# Patient Record
Sex: MALE | Race: WHITE
[De-identification: names, ages, dates, MRNs, and addresses within clinical notes are randomized per-mention and may not be internally consistent; named-entity substitution may affect disease eponyms.]

---

## 2017-06-03 ENCOUNTER — HOSPITAL ENCOUNTER (EMERGENCY)
Dept: HOSPITAL 41 - JD.ED | Age: 37
Discharge: HOME | End: 2017-06-03
Payer: SELF-PAY

## 2017-06-03 VITALS — DIASTOLIC BLOOD PRESSURE: 80 MMHG | SYSTOLIC BLOOD PRESSURE: 116 MMHG

## 2017-06-03 DIAGNOSIS — Z79.899: ICD-10-CM

## 2017-06-03 DIAGNOSIS — F41.9: Primary | ICD-10-CM

## 2017-06-03 DIAGNOSIS — Z86.2: ICD-10-CM

## 2017-06-03 PROCEDURE — 84484 ASSAY OF TROPONIN QUANT: CPT

## 2017-06-03 PROCEDURE — 85025 COMPLETE CBC W/AUTO DIFF WBC: CPT

## 2017-06-03 PROCEDURE — 93005 ELECTROCARDIOGRAM TRACING: CPT

## 2017-06-03 PROCEDURE — 80053 COMPREHEN METABOLIC PANEL: CPT

## 2017-06-03 PROCEDURE — 71020: CPT

## 2017-06-03 PROCEDURE — 84443 ASSAY THYROID STIM HORMONE: CPT

## 2017-06-03 PROCEDURE — 99285 EMERGENCY DEPT VISIT HI MDM: CPT

## 2017-06-03 PROCEDURE — 36415 COLL VENOUS BLD VENIPUNCTURE: CPT

## 2017-06-03 NOTE — EDM.PDOC
ED HPI GENERAL MEDICAL PROBLEM





- General


Chief Complaint: Respiratory Problem


Stated Complaint: SOB


Time Seen by Provider: 06/03/17 13:06


Source of Information: Reports: Patient


History Limitations: Reports: No Limitations





- History of Present Illness


INITIAL COMMENTS - FREE TEXT/NARRATIVE: 


Patient is a 37-year-old male who presents to the E.D complaining of 

progressive shortness of breath and tingling feeling in his chest. States it 

has been going on for the past 2 days. Notes he gets worse with stressing out. 

Has also experienced some numbness into into his fingers and toes with 

breathing faster. States he has been under a lot more stress recently and has 

been having minimal sleep due to work. Note some excessive yawning and taking a 

lot of deep breaths. He is mildly short of breath with exertion. There's been 

no cough, fever, chest pain, nausea/vomiting, dizziness, pre-syncope/syncopal 

episode or any additional complaints. He has no history of DVT/PE.  He has been 

utilizing albuterol inhaler that was prescribed by a provider at the urgent 

care clinic in [facility]. He's had no relief with the albuterol.





Past medical history vitamin B12 and T3 deficiency





Current medications albuterol inhaler, Centrum, B12, and the 3





Patient does not smoke or utilize alcohol occasionally denies recreational drug 

use.


Onset Date: 06/01/17


Duration: Intermittent


Location: Reports: Chest


Associated Symptoms: Reports: Shortness of Breath.  Denies: Cough, Diaphoresis, 

Fever/Chills, Nausea/Vomiting, Syncope, Weakness


Treatments PTA: Reports: Other (see below) (see hpi)


  ** Chest


Pain Score (Numeric/FACES): 5





- Related Data


 Allergies











Allergy/AdvReac Type Severity Reaction Status Date / Time


 


No Known Allergies Allergy   Verified 06/03/17 13:01











Home Meds: 


 Home Meds





Cyanocobalamin (Vitamin B12) [Vitamin B12] 1 unit PO DAILY 06/03/17 [History]


Ergocalciferol (Vitamin D2) [Vitamin D] 1 unit PO DAILY 06/03/17 [History]











Past Medical History


Gastrointestinal History: Reports: Pancreatitis


Hematologic History: Reports: Anemia, B12 Deficiency





Social & Family History





- Tobacco Use


Smoking Status *Q: Never Smoker





- Caffeine Use


Caffeine Use: Reports: None





- Recreational Drug Use


Recreational Drug Use: No





ED ROS GENERAL





- Review of Systems


Review Of Systems: See Below


Constitutional: Reports: No Symptoms


HEENT: Reports: Sinus Problem.  Denies: Ear Pain, Throat Pain, Vertigo, Vision 

Change


Respiratory: Reports: Shortness of Breath.  Denies: Cough, Sputum


Cardiovascular: Reports: Palpitations (occassionally at night).  Denies: Chest 

Pain, Dyspnea on Exertion, PND, Syncope


GI/Abdominal: Denies: Abdominal Pain, Constipation, Diarrhea, Nausea, Vomiting


: Reports: No Symptoms


Musculoskeletal: Reports: No Symptoms


Neurological: Denies: Dizziness





ED EXAM, GENERAL





- Physical Exam


Exam: See Below


Exam Limited By: No Limitations


General Appearance: Alert, WD/WN, No Apparent Distress


Eye Exam: Bilateral Eye: EOMI, PERRL


Ears: Hearing Grossly Normal


Nose: Normal Inspection


Throat/Mouth: Normal Voice, No Airway Compromise


Neck: Normal Inspection, Supple, Non-Tender.  No: Lymphadenopathy (L), 

Lymphadenopathy (R)


Respiratory/Chest: No Respiratory Distress, Lungs Clear, Normal Breath Sounds, 

No Accessory Muscle Use, Chest Non-Tender


Cardiovascular: Normal Peripheral Pulses, Regular Rate, Rhythm, No Murmur


Peripheral Pulses: 2+: Radial (L), Radial (R)


GI/Abdominal: Normal Bowel Sounds, Soft, Non-Tender, No Organomegaly, No 

Distention, No Mass


Back Exam: Normal Inspection


Extremities: Normal Inspection, Non-Tender, No Pedal Edema, Normal Capillary 

Refill


Neurological: Alert, Oriented, CN II-XII Intact, Normal Cognition, No Motor/

Sensory Deficits


Psychiatric: Normal Affect, Normal Mood


Skin Exam: Warm, Dry, Intact, Normal Color, No Rash





Course





- Vital Signs


Last Recorded V/S: 


 Last Vital Signs











Temp  98.3 F   06/03/17 12:57


 


Pulse  87   06/03/17 12:57


 


Resp  18   06/03/17 12:57


 


BP  138/86   06/03/17 12:57


 


Pulse Ox  98   06/03/17 12:57














- Orders/Labs/Meds


Orders: 


 Active Orders 24 hr











 Category Date Time Status


 


 EKG Documentation Completion [RC] STAT Care  06/03/17 13:26 Active


 


 Chest 2V [CR] Stat Exams  06/03/17 13:25 Taken











Labs: 


 Laboratory Tests











  06/03/17 06/03/17 Range/Units





  13:36 13:36 


 


WBC  5.29   (4.23-9.07)  K/mm3


 


RBC  4.60 L   (4.63-6.08)  M/mm3


 


Hgb  14.4   (13.7-17.5)  gm/L


 


Hct  40.2   (40.1-51.0)  %


 


MCV  87.4   (79.0-92.2)  fl


 


MCH  31.3   (25.7-32.2)  pg


 


MCHC  35.8 H   (32.2-35.5)  g/dl


 


RDW Std Deviation  37.2   (35.1-43.9)  fL


 


Plt Count  275   (163-337)  K/mm3


 


MPV  8.5 L   (9.4-12.3)  fl


 


Neut % (Auto)  62.5   (34.0-67.9)  %


 


Lymph % (Auto)  27.6   (21.8-53.1)  %


 


Mono % (Auto)  9.1   (5.3-12.2)  %


 


Eos % (Auto)  0.4 L   (0.8-7.0)  


 


Baso % (Auto)  0.4   (0.1-1.2)  %


 


Neut # (Auto)  3.31   (1.78-5.38)  K/mm3


 


Lymph # (Auto)  1.46   (1.32-3.57)  K/mm3


 


Mono # (Auto)  0.48   (0.30-0.82)  K/mm3


 


Eos # (Auto)  0.02 L   (0.04-0.54)  K/mm3


 


Baso # (Auto)  0.02   (0.01-0.08)  K/mm3


 


Sodium   138  (136-145)  mEq/L


 


Potassium   4.0  (3.5-5.1)  mEq/L


 


Chloride   103  ()  mEq/L


 


Carbon Dioxide   26  (21-32)  mEq/L


 


Anion Gap   13.0  (5-15)  


 


BUN   14  (7-18)  mg/dL


 


Creatinine   1.1  (0.7-1.3)  mg/dL


 


Est Cr Clr Drug Dosing   95.86  mL/min


 


Estimated GFR (MDRD)   > 60  (>60)  mL/min


 


BUN/Creatinine Ratio   12.7 L  (14-18)  


 


Glucose   78  ()  mg/dL


 


Calcium   8.7  (8.5-10.1)  mg/dL


 


Total Bilirubin   0.4  (0.2-1.0)  mg/dL


 


AST   24  (15-37)  U/L


 


ALT   39  (16-63)  U/L


 


Alkaline Phosphatase   87  ()  U/L


 


Troponin I   < 0.017  (0.00-0.056)  ng/mL


 


Total Protein   7.5  (6.4-8.2)  g/dl


 


Albumin   4.1  (3.4-5.0)  g/dl


 


Globulin   3.4  gm/dL


 


Albumin/Globulin Ratio   1.2  (1-2)  


 


TSH 3rd Generation   0.952  (0.358-3.74)  uIU/mL











Meds: 


Medications














Discontinued Medications














Generic Name Dose Route Start Last Admin





  Trade Name Freq  PRN Reason Stop Dose Admin


 


Lorazepam  1 mg  06/03/17 13:26  06/03/17 13:37





  Ativan  PO  06/03/17 13:27  1 mg





  ONETIME ONE   Administration














- Re-Assessments/Exams


Free Text/Narrative Re-Assessment/Exam: 





06/03/17 13:38 Ordered Ativan 1 mg p.o., CBC, chem 14, troponin, TSH, chest x-

ray, and EKG.





EKG sinus rhythm at a rate of 69 with normal P axis. , VT interval is 

110. No acute ST changes noted.





Chest x-ray did not elicit any acute findings. Final interpretation is pending. 

Dr. Lawson reviewed x-ray. 





Labs reviewed did not reveal any concerning findings.





1437 Reassessment, symptoms have improved with the Ativan therapy. Suggesting 

this is associated with anxiety/stress.  Patient is ready to be discharged 

home. We'll discharge patient home with instructions as documented











Departure





- Departure


Time of Disposition: 14:58


Disposition: Home, Self-Care 01


Condition: good


Clinical Impression: 


Anxiety disorder, unspecified


Qualifiers:


 Anxiety disorder type: unspecified anxiety disorder Qualified Code(s): F41.9 - 

Anxiety disorder, unspecified








- Discharge Information


Referrals: 


Rosibel Cox PA [Physician Assistant] - 


Forms:  ED Department Discharge, Return to Work/School Form


Additional Instructions: 


As discussed labs and CXR did not elicit any concerning findings.  Symptoms 

improved after being administered ativan. Suggesting this was associated with 

anxiety brought on by stress.  Thus will have you followup with a PCP this 

coming week at [facility] for reevaluation and treatment.  No 

driving today since receiving sedative medication. Get adequate sleep. Find 

time to do things you enjoy other then work.  Stay away from alcohol use.  

Return to the E.D. for any new or worsening symptoms. 





- My Orders


Last 24 Hours: 


My Active Orders





06/03/17 13:25


Chest 2V [CR] Stat 





06/03/17 13:26


EKG Documentation Completion [RC] STAT 














- Assessment/Plan


Last 24 Hours: 


My Active Orders





06/03/17 13:25


Chest 2V [CR] Stat 





06/03/17 13:26


EKG Documentation Completion [RC] STAT

## 2017-06-05 NOTE — CR
Chest: Two views of the chest were obtained.

 

Comparison: No previous chest x-ray.

 

Heart size and mediastinum are normal.  Lungs are clear.  Bony 

structures are unremarkable for the patient's age.

 

Impression:

1.  Nothing acute is identified on two-view chest x-ray.

 

Diagnostic code #1

## 2019-06-12 ENCOUNTER — HOSPITAL ENCOUNTER (EMERGENCY)
Dept: HOSPITAL 56 - MW.ED | Age: 39
Discharge: HOME | End: 2019-06-12
Payer: SELF-PAY

## 2019-06-12 VITALS — DIASTOLIC BLOOD PRESSURE: 76 MMHG | SYSTOLIC BLOOD PRESSURE: 129 MMHG

## 2019-06-12 DIAGNOSIS — Z79.899: ICD-10-CM

## 2019-06-12 DIAGNOSIS — K21.9: ICD-10-CM

## 2019-06-12 DIAGNOSIS — E78.00: ICD-10-CM

## 2019-06-12 DIAGNOSIS — K62.89: Primary | ICD-10-CM

## 2019-06-12 LAB
CHLORIDE SERPL-SCNC: 100 MMOL/L (ref 98–107)
SODIUM SERPL-SCNC: 135 MMOL/L (ref 136–148)

## 2019-06-12 PROCEDURE — 36415 COLL VENOUS BLD VENIPUNCTURE: CPT

## 2019-06-12 PROCEDURE — 80053 COMPREHEN METABOLIC PANEL: CPT

## 2019-06-12 PROCEDURE — 81003 URINALYSIS AUTO W/O SCOPE: CPT

## 2019-06-12 PROCEDURE — 74177 CT ABD & PELVIS W/CONTRAST: CPT

## 2019-06-12 PROCEDURE — 96360 HYDRATION IV INFUSION INIT: CPT

## 2019-06-12 PROCEDURE — 85025 COMPLETE CBC W/AUTO DIFF WBC: CPT

## 2019-06-12 PROCEDURE — 93005 ELECTROCARDIOGRAM TRACING: CPT

## 2019-06-12 PROCEDURE — 83690 ASSAY OF LIPASE: CPT

## 2019-06-12 PROCEDURE — 99284 EMERGENCY DEPT VISIT MOD MDM: CPT

## 2019-06-12 NOTE — CT
INDICATION: Right lower quadrant pain with bloody stool



TECHNIQUE: CT Abdomen and pelvis with i.v. contrast. Coronal and sagittal 

reformats were obtained.



CONTRAST: 100 mL Isovue 370



COMPARISON: None



FINDINGS: 



Lower chest: Unremarkable. 



Liver: Unremarkable. 



Spleen: Unremarkable. 



Pancreas: Unremarkable. 



Gallbladder: Unremarkable. 



Kidney: There is a 2.4 cm cyst present in the upper pole of the left 

kidney. 



Adrenal: Unremarkable. 



Bowel: Mild wall thickening of the rectum is present. The appendix is 

normal in appearance and size. 



Vascular: Unremarkable. 



Lymph: Unremarkable. 



Peritoneum: Unremarkable. No pneumoperitoneum is seen. No significant 

ascites is noted. 



Pelvis: The bladder is decompressed and difficult to evaluate due to 

suspect an abnormal thickening. 



Soft tissue: Unremarkable. 



Bone: Unremarkable for age. 



IMPRESSION: 



1. Mild wall thickening of the rectum is present. Proctitis is suspected 

and followup imaging is recommended to document resolution.



2. The bladder is decompressed and difficult to evaluate due to suspect an 

abnormal thickening. Correlation with urinalysis is recommended to exclude 

cystitis or urinary tract infection. 



Dictated by Chance Rodríguez MD @ 6/12/2019 6:14:28 PM



Please note that all CT scans at this facility use dose modulation, 

iterative reconstruction, and/or weight-based dosing when appropriate to 

reduce radiation dose to as low as reasonably achievable.



Dictated by: Chance Rodríguez MD @ 06/12/2019 18:17:02



(Electronically Signed)

## 2019-06-12 NOTE — EDM.PDOC
ED HPI GENERAL MEDICAL PROBLEM





- General


Chief Complaint: Abdominal Pain


Stated Complaint: ABDOMINAL PAIN


Time Seen by Provider: 06/12/19 16:27


Source of Information: Reports: Patient


History Limitations: Reports: No Limitations





- History of Present Illness


INITIAL COMMENTS - FREE TEXT/NARRATIVE: 


HISTORY AND PHYSICAL:





History of present illness:


Patient is a 39-year-old male presents to the ED today with concern of right 

lower quadrant pain over the past 2 days. Patient states he did have a one 

episode of bright red blood bowel movement on Sunday. Patient denies any blood 

in the stool since then. Patient states his bowel movements have been normal 

for him and then other than the right lower quadrant pain he hasn't had any 

other symptoms. He states that the pain comes and goes and currently rates it a 

5 out of 10. Patient denies any prior abdominal surgeries. Patient denies any 

health history.





Patient denies fever, chills, chest pain, shortness of breath, or cough. Denies 

headache, neck stiff ness, change in vision, syncope, or near syncope. Denies 

nausea, vomiting, diarrhea, constipation, or dysuria. Has not noted any blood 

in urine or stool. Patient has been eating and drinking appropriately.





Review of systems: 


As per history of present illness and below otherwise all systems reviewed and 

negative.





Past medical history: 


As per history of present illness and as reviewed below otherwise 

noncontributory.





Surgical history: 


As per history of present illness and as reviewed below otherwise 

noncontributory.





Social history: 


See social history for further information





Family history: 


As per history of present illness and as reviewed below otherwise 

noncontributory.





Physical exam:


General: Patient is alert, oriented, and in no acute distress. Patient sitting 

comfortably on exam table.


HEENT: Atraumatic, normocephalic, pupils equal and reactive bilaterally, 

negative for conjunctival pallor or scleral icterus, mucous membranes moist, 

TMs normal bilaterally, throat clear, neck supple, nontender, trachea midline. 

No drooling or trismus noted. No meningeal signs. No hot potato voice noted. 


Lungs: Clear to auscultation, breath sounds equal bilaterally, chest nontender.


Heart: S1S2, regular rate and rhythm without overt murmur


Abdomen: Soft, nondistended. Mild pain to palpation of the right lower quadrant 

without guarding. Negative for masses or hepatosplenomegaly. Negative for 

costovertebral tenderness.


Pelvis: Stable nontender.


Genitourinary: Deferred.


Rectal: Deferred.


Skin: Intact, warm, dry. No lesions or rashes noted.


Extremities: Atraumatic, negative for cords or calf pain. Neurovascular 

unremarkable.


Neuro: Awake, alert, oriented. Cranial nerves II through XII unremarkable. 

Cerebellum unremarkable. Motor and sensory unremarkable throughout. Exam 

nonfocal.





Notes:


Dr. Boss verbally involved in patient care.


Patient denies anal intercourse / penetration. Discussed cyst of kidney and 

follow up with primary care,


Consult to Dr. Garcia. Per Dr. Garcia, start patient on Flagyl, obtain stool 

cultures outpatient and have results sent to her for follow up with her in 2 

weeks. Patient provided with stool collection supplies.


Discussed this with patient. Voices understanding and is agreeable to plan of 

care. Denies any further questions or concerns at this time.





Diagnostics:


CBC, CMP, UA, EKG, lipase, abdominal pelvic CT, Hemoccult





Therapeutics:


Normal saline, Zofran





Prescription:


Flagyl, Stool Studies script and supplies





Impression: 


Rectum thickening, probable proctitis





Plan:


1. Take medication as prescribed. Stool study collection supplies have been 

provided to you, return these to the outpatient lab after sample.


2. Follow-up with general surgery, Dr. Garcia, as discussed. Return to the ED 

as needed and as discussed.


3. Tylenol as directed for pain and discomfort.








Definitive disposition and diagnosis as appropriate pending reevaluation and 

review of above.





  ** lower abdomen


Pain Score (Numeric/FACES): 6





- Related Data


 Allergies











Allergy/AdvReac Type Severity Reaction Status Date / Time


 


No Known Allergies Allergy   Verified 06/12/19 16:34











Home Meds: 


 Home Meds





Pantoprazole [ProTONIX***] 40 mg PO DAILY 06/12/19 [History]


atorvaSTATin [Lipitor] 20 mg PO DAILY 06/12/19 [History]











Past Medical History


Cardiovascular History: Reports: High Cholesterol


Gastrointestinal History: Reports: GERD, Pancreatitis


Hematologic History: Reports: Anemia





Social & Family History





- Family History


Family Medical History: Noncontributory





- Tobacco Use


Smoking Status *Q: Never Smoker





- Caffeine Use


Caffeine Use: Reports: None





- Recreational Drug Use


Recreational Drug Use: No





ED ROS GENERAL





- Review of Systems


Review Of Systems: ROS reveals no pertinent complaints other than HPI.





ED EXAM, GI/ABD





- Physical Exam


Exam: See Below (See dictation)





Course





- Vital Signs


Last Recorded V/S: 


 Last Vital Signs











Temp  36.1 C   06/12/19 16:32


 


Pulse  87   06/12/19 18:05


 


Resp  18   06/12/19 16:32


 


BP  122/73   06/12/19 18:05


 


Pulse Ox  98   06/12/19 18:05














- Orders/Labs/Meds


Orders: 


 Active Orders 24 hr











 Category Date Time Status


 


 EKG Documentation Completion [RC] STAT Care  06/12/19 16:54 Active


 


 Hemoccult [OCCULT BLOOD DIAGNOSTIC] [OP] Stat Lab  06/12/19 16:54 Ordered











Labs: 


 Laboratory Tests











  06/12/19 06/12/19 06/12/19 Range/Units





  16:57 16:57 17:06 


 


WBC  5.02    (4.0-11.0)  K/uL


 


RBC  4.96    (4.50-5.90)  M/uL


 


Hgb  15.5    (13.0-17.0)  g/dL


 


Hct  44.0    (38.0-50.0)  %


 


MCV  88.7    (80.0-98.0)  fL


 


MCH  31.3    (27.0-32.0)  pg


 


MCHC  35.2    (31.0-37.0)  g/dL


 


RDW Std Deviation  38.5    (28.0-62.0)  fl


 


RDW Coeff of Kimberly  12    (11.0-15.0)  %


 


Plt Count  268    (150-400)  K/uL


 


MPV  8.90    (7.40-12.00)  fL


 


Neut % (Auto)  51.5    (48.0-80.0)  %


 


Lymph % (Auto)  35.3    (16.0-40.0)  %


 


Mono % (Auto)  11.6    (0.0-15.0)  %


 


Eos % (Auto)  1.0    (0.0-7.0)  %


 


Baso % (Auto)  0.6    (0.0-1.5)  %


 


Neut # (Auto)  2.6    (1.4-5.7)  K/uL


 


Lymph # (Auto)  1.8    (0.6-2.4)  K/uL


 


Mono # (Auto)  0.6    (0.0-0.8)  K/uL


 


Eos # (Auto)  0.1    (0.0-0.7)  K/uL


 


Baso # (Auto)  0.0    (0.0-0.1)  K/uL


 


Nucleated RBC %  0.0    /100WBC


 


Nucleated RBCs #  0    K/uL


 


Sodium   135 L   (136-148)  mmol/L


 


Potassium   4.2   (3.5-5.1)  mmol/L


 


Chloride   100   ()  mmol/L


 


Carbon Dioxide   25.3   (21.0-32.0)  mmol/L


 


BUN   13   (7.0-18.0)  mg/dL


 


Creatinine   1.1   (0.8-1.3)  mg/dL


 


Est Cr Clr Drug Dosing   94.02   mL/min


 


Estimated GFR (MDRD)   > 60.0   ml/min


 


Glucose   91   ()  mg/dL


 


Calcium   9.3   (8.5-10.1)  mg/dL


 


Total Bilirubin   0.8   (0.2-1.0)  mg/dL


 


AST   35   (15-37)  IU/L


 


ALT   77 H   (14-63)  IU/L


 


Alkaline Phosphatase   100   ()  U/L


 


Total Protein   8.2   (6.4-8.2)  g/dL


 


Albumin   4.6   (3.4-5.0)  g/dL


 


Globulin   3.6   (2.6-4.0)  g/dL


 


Albumin/Globulin Ratio   1.3   (0.9-1.6)  


 


Lipase   121   ()  U/L


 


Urine Color    YELLOW  


 


Urine Appearance    CLEAR  


 


Urine pH    6.0  (5.0-8.0)  


 


Ur Specific Gravity    >= 1.030  (1.001-1.035)  


 


Urine Protein    NEGATIVE  (NEGATIVE)  mg/dL


 


Urine Glucose (UA)    NEGATIVE  (NEGATIVE)  mg/dL


 


Urine Ketones    NEGATIVE  (NEGATIVE)  mg/dL


 


Urine Occult Blood    NEGATIVE  (NEGATIVE)  


 


Urine Nitrite    NEGATIVE  (NEGATIVE)  


 


Urine Bilirubin    NEGATIVE  (NEGATIVE)  


 


Urine Urobilinogen    0.2  (<2.0)  EU/dL


 


Ur Leukocyte Esterase    NEGATIVE  (NEGATIVE)  











Meds: 


Medications














Discontinued Medications














Generic Name Dose Route Start Last Admin





  Trade Name Freq  PRN Reason Stop Dose Admin


 


Sodium Chloride  1,000 mls @ 999 mls/hr  06/12/19 16:53  06/12/19 17:04





  Normal Saline  IV  06/12/19 17:53  999 mls/hr





  BOLUS ONE   Administration





     





     





     





     


 


Iopamidol  100 ml  06/12/19 17:50  06/12/19 17:50





  Isovue Multipack-370 (76%)  IVPUSH  06/12/19 17:51  100 ml





  ONETIME STA   Administration





     





     





     





     


 


Ondansetron HCl  4 mg  06/12/19 16:47  06/12/19 17:05





  Zofran  IVPUSH  06/12/19 16:48  Not Given





  ONETIME ONE   





     





     





     





     














Departure





- Departure


Time of Disposition: 19:11


Disposition: Home, Self-Care 01


Clinical Impression: 


 Proctitis








- Discharge Information


Instructions:  Proctitis


Referrals: 


PCP,None [Primary Care Provider] - 


Forms:  ED Department Discharge


Additional Instructions: 


The following information is given to patients seen in the emergency department 

who are being discharged to home. This information is to outline your options 

for follow-up care. We provide all patients seen in our emergency department 

with a follow-up referral.





The need for follow-up, as well as the timing and circumstances, are variable 

depending upon the specifics of your emergency department visit.





If you don't have a primary care physician on staff, we will provide you with a 

referral. We always advise you to contact your personal physician following an 

emergency department visit to inform them of the circumstance of the visit and 

for follow-up with them and/or the need for any referrals to a consulting 

specialist.





The emergency department will also refer you to a specialist when appropriate. 

This referral assures that you have the opportunity for follow-up care with a 

specialist. All of these measure are taken in an effort to provide you with 

optimal care, which includes your follow-up.





Under all circumstances we always encourage you to contact your private 

physician who remains a resource for coordinating your care. When calling for 

follow-up care, please make the office aware that this follow-up is from your 

recent emergency room visit. If for any reason you are refused follow-up, 

please contact the [facility] Emergency 

Department at [phone] and asked to speak to the emergency department 

charge nurse.





[facility]


Primary Care


[address] [place] [address]


Phone: [phone]


Fax: [phone]





[address] [address]


Phone: [phone]


Fax: [phone]





[facility] - General Surgery


[address]


[address] [place] [address], [address] 


[place] [address]


Phone: [phone]


Fax: [phone]





1. Take medication as prescribed. Stool study collection supplies have been 

provided to you, return these to the outpatient lab after sample.


2. Follow-up with general surgery, Dr. Garcia, as discussed. Return to the ED 

as needed and as discussed.


3. Tylenol as directed for pain and discomfort.








 








- My Orders


Last 24 Hours: 


My Active Orders





06/12/19 16:54


EKG Documentation Completion [RC] STAT 


Hemoccult [OCCULT BLOOD DIAGNOSTIC] [OP] Stat 














- Assessment/Plan


Last 24 Hours: 


My Active Orders





06/12/19 16:54


EKG Documentation Completion [RC] STAT 


Hemoccult [OCCULT BLOOD DIAGNOSTIC] [OP] Stat